# Patient Record
Sex: MALE | Race: WHITE
[De-identification: names, ages, dates, MRNs, and addresses within clinical notes are randomized per-mention and may not be internally consistent; named-entity substitution may affect disease eponyms.]

---

## 2022-06-24 ENCOUNTER — HOSPITAL ENCOUNTER (OUTPATIENT)
Dept: HOSPITAL 75 - CARD | Age: 67
End: 2022-06-24
Attending: FAMILY MEDICINE
Payer: COMMERCIAL

## 2022-06-24 DIAGNOSIS — I49.9: Primary | ICD-10-CM

## 2022-06-24 PROCEDURE — 93225 XTRNL ECG REC<48 HRS REC: CPT

## 2022-06-24 PROCEDURE — 93005 ELECTROCARDIOGRAM TRACING: CPT

## 2022-06-24 PROCEDURE — 93226 XTRNL ECG REC<48 HR SCAN A/R: CPT

## 2022-07-12 ENCOUNTER — HOSPITAL ENCOUNTER (OUTPATIENT)
Dept: HOSPITAL 75 - CARD | Age: 67
End: 2022-07-12
Attending: INTERNAL MEDICINE
Payer: COMMERCIAL

## 2022-07-12 DIAGNOSIS — I25.10: ICD-10-CM

## 2022-07-12 DIAGNOSIS — I10: ICD-10-CM

## 2022-07-12 DIAGNOSIS — I65.23: Primary | ICD-10-CM

## 2022-07-12 LAB
BUN/CREAT SERPL: 13
CREAT SERPL-MCNC: 0.93 MG/DL (ref 0.6–1.3)
GFR SERPLBLD BASED ON 1.73 SQ M-ARVRAT: 92 ML/MIN

## 2022-07-12 PROCEDURE — 84520 ASSAY OF UREA NITROGEN: CPT

## 2022-07-12 PROCEDURE — 93306 TTE W/DOPPLER COMPLETE: CPT

## 2022-07-12 PROCEDURE — 82565 ASSAY OF CREATININE: CPT

## 2022-07-12 PROCEDURE — 70498 CT ANGIOGRAPHY NECK: CPT

## 2022-07-12 PROCEDURE — 36415 COLL VENOUS BLD VENIPUNCTURE: CPT

## 2022-07-12 NOTE — DIAGNOSTIC IMAGING REPORT
CT ANGIO NECK W



INDICATION: Carotid artery stenosis



COMPARISON: None available.



TECHNIQUE: CT imaging of the neck and head was performed with IV

contrast. 3-D MIP reformats are created and submitted. Automatic

exposure is utilized for dose optimization.



FINDINGS: The aortic arch is normal in caliber without

dissection. Great vessels of the aortic arch are widely patent.

The bilateral common carotid arteries are normal. A large amount

of calcified atherosclerotic plaquing is present in the proximal

right ICA and this results in 60% stenosis per NASCET criteria.

Atherosclerotic calcification in the proximal left ICA resulting

in 50% stenosis per NASCET criteria. The cervical divisions of

the internal carotid arteries are otherwise widely patent.



The origins of the bilateral vertebral arteries are normal.

Vertebral arteries are codominant and have no stenosis or

dissection. No cervical lymphadenopathy. The thyroid has multiple

nodules present, largest of which measures 2.0 x 1.4 cm in the

right lobe.



Distal internal carotid arteries are widely patent and there is

no terminal aneurysm. The M1 and M2 divisions of the middle

cerebral arteries are patent. Anterior cerebral arteries are

normal. There is no saccular aneurysm within the anterior

circulation.



Basilar artery is widely patent and there is no terminal

aneurysm. Posterior cerebral arteries are normal. No aneurysm

within the posterior communicating arteries. Dural venous sinuses

are patent.



IMPRESSION: 

1. There is between 50-60% stenosis of the proximal internal

carotid arteries on both sides, greater on the right.

2. Widely patent vertebral arteries without stenosis or

dissection.

3. No intracranial large vessel occlusion or saccular aneurysm.



Dictated by: 



  Dictated on workstation # [id number]

## 2022-07-27 ENCOUNTER — HOSPITAL ENCOUNTER (OUTPATIENT)
Dept: HOSPITAL 75 - CARD | Age: 67
End: 2022-07-27
Attending: INTERNAL MEDICINE
Payer: COMMERCIAL

## 2022-07-27 VITALS — HEIGHT: 69.69 IN | WEIGHT: 145.51 LBS | BODY MASS INDEX: 21.07 KG/M2

## 2022-07-27 VITALS — SYSTOLIC BLOOD PRESSURE: 182 MMHG | DIASTOLIC BLOOD PRESSURE: 86 MMHG

## 2022-07-27 DIAGNOSIS — I25.10: Primary | ICD-10-CM

## 2022-07-27 DIAGNOSIS — I10: ICD-10-CM

## 2022-07-27 PROCEDURE — 93017 CV STRESS TEST TRACING ONLY: CPT

## 2022-07-27 PROCEDURE — 78452 HT MUSCLE IMAGE SPECT MULT: CPT

## 2022-07-27 RX ADMIN — Medication PRN ML: at 08:59

## 2022-07-27 RX ADMIN — Medication PRN ML: at 07:04

## 2022-07-27 NOTE — CARDIOLOGY STRESS TEST REPORT
Stress Test Report


Date of Procedure/Referring:


Date of Procedure:  Jul 27, 2022


PCP


Anna Zheng DO


Admitting Physician


Admitting Physician:


 








Attending Physician:


Alyssa Rey MD





Indications:


CP





Baseline Heart Rate:


63





Baseline Blood Pressure:


Blood Pressure Systolic:  182


Blood Pressure Diastolic:  86


Baseline Vitals





Vital Signs








  Date Time  Temp Pulse Resp B/P (MAP) Pulse Ox O2 Delivery O2 Flow Rate FiO2


 


7/27/22 08:58  62 20 182/86 (118) 99 Room Air  











Baseline EKG:


Baseline EKG:  NSR





Summary


After explaining the procedure to the patient, he  signed a consent and then 

brought to the stress nuclear laboratory.


Patient received 0.4 mg Lexiscan for stress test, ECG, heart rate and blood 

pressure were monitored continuously.  Resting and stress dose of radio tracer 

were injected, imaging was acquired and reviewed in short axis, horizontal long 

axis and vertical long axis views.


TID:  1.13


SSS:  1


SDS:  0


EF:  39


1.  Patient tolerated Lexiscan well


2.  Diaphragmatic attenuation with no significant ischemia or infarction on 

SPECT images


3.  Normal left ventricular size with mild hypokinesia of the inferior wall and 

inferoseptum, ejection fraction 39%





Copy


Copies To 1:   ANNA ZHENG BASHAR J MD              Jul 27, 2022 14:46